# Patient Record
Sex: MALE | Race: WHITE
[De-identification: names, ages, dates, MRNs, and addresses within clinical notes are randomized per-mention and may not be internally consistent; named-entity substitution may affect disease eponyms.]

---

## 2019-02-04 ENCOUNTER — HOSPITAL ENCOUNTER (EMERGENCY)
Dept: HOSPITAL 62 - ER | Age: 24
Discharge: HOME | End: 2019-02-04
Payer: SELF-PAY

## 2019-02-04 VITALS — DIASTOLIC BLOOD PRESSURE: 72 MMHG | SYSTOLIC BLOOD PRESSURE: 134 MMHG

## 2019-02-04 DIAGNOSIS — K08.89: ICD-10-CM

## 2019-02-04 DIAGNOSIS — K02.9: Primary | ICD-10-CM

## 2019-02-04 DIAGNOSIS — F17.200: ICD-10-CM

## 2019-02-04 DIAGNOSIS — K05.10: ICD-10-CM

## 2019-02-04 PROCEDURE — 96372 THER/PROPH/DIAG INJ SC/IM: CPT

## 2019-02-04 PROCEDURE — 99282 EMERGENCY DEPT VISIT SF MDM: CPT

## 2019-02-04 NOTE — ER DOCUMENT REPORT
HPI





- HPI


Time Seen by Provider: 02/04/19 07:45


Pain Level: 2


Notes: 





Patient is a 23-year-old male who presents to the ED complaining of left upper 

dental pain #14  1 week.  He has not noticed any obvious abscess or purulent 

discharge.  Patient states that he is still able to eat and drink, but does have

a decreased p.o. intake due to the pain.  He has tried some over-the-counter 

meds with minimal relief.  No other concerns or complaints.  Denies any 

headache, fever, head injury, neck pain, hoarseness, drooling, URI, sore throat,

chest pain, palpitations, syncope, cough, shortness of breath, wheeze, dyspnea, 

abdominal pain, nausea/vomiting/diarrhea, urinary retention, dysuria, hematuria,

or rash.





- ROS


Systems Reviewed and Negative: Yes All other systems reviewed and negative





Past Medical History





- Social History


Smoking Status: Current Every Day Smoker


Family History: Reviewed & Not Pertinent


Patient has suicidal ideation: No


Patient has homicidal ideation: No


Renal/ Medical History: Denies: Hx Peritoneal Dialysis


Past Surgical History: Reports: Hx Abdominal Surgery





Vertical Provider Document





- CONSTITUTIONAL


Agree With Documented VS: Yes


Notes: 





PHYSICAL EXAMINATION:





GENERAL: Well-appearing, well-nourished and in no acute distress.





HEAD: Atraumatic, normocephalic.





EYES: Pupils equal round and reactive to light, extraocular movements intact, 

sclera anicteric, conjunctiva are normal.





ENT: EAC clear b/l.  TM's intact b/l without erythema, fluid, or perforation.  

Nares patent and without discharge.  oropharynx clear without exudates.  No 

tonsilar hypertrophy or erythema.  Moist mucous membranes.  No sinus tenderness.

 Uvula midline.  No palatine shift.  No tongue protrusion.  No respiratory 

compromise.





Mouth:  Poor dentition.  + severe decay and mild gingivitis.  No obvious abscess

or discharge noted.  No facial swelling.  + tenderness to tooth #14.





NECK: Normal range of motion, supple without lymphadenopathy.  No 

rigidity/meningismus.





LUNGS: Breath sounds clear to auscultation bilaterally and equal.  No wheezes 

rales or rhonchi.





HEART: Regular rate and rhythm without murmurs, rubs, gallops.





NEUROLOGICAL: Cranial nerves grossly intact.  Normal speech, normal gait.  

Normal sensory, motor exams 





PSYCH: Normal mood, normal affect.





SKIN: Warm, Dry, normal turgor, no rashes or lesions noted.





Course





- Re-evaluation


Re-evalutation: 





02/04/19 08:02


Patient is an afebrile, well-hydrated, 23-year-old male who presents to the ED 

with dental pain, suspect nerve root etiology versus infection.  Vitals are 

acceptable.  PE is otherwise unremarkable.  No I&D, labs, or imaging warranted 

at this time based on H&P.  Viscous lidocaine and toradol given today.  I will 

send him home with a prescription for penicillin.  Low suspicion for any 

meningitis, sepsis, peritonsillar/pharyngeal abscess, respiratory compromise, 

Armen's, temporal arteritis, or other emergent systemic condition at this time.

 Patient is aware this condition can change from initial presentation and he 

needs to monitor symptoms closely.  Conservative measures otherwise for 

symptoms.  Call to schedule an appointment with a dentist for further evaluation

and management.  Recheck with your PCM this week as well.  Return to the ED with

any worsening/concerning symptoms otherwise as reviewed in discharge.  Patient 

is in agreement.





- Vital Signs


Vital signs: 


                                        











Temp Pulse Resp BP Pulse Ox


 


 98.1 F   71   18   134/72 H  100 


 


 02/04/19 07:30  02/04/19 07:30  02/04/19 07:30  02/04/19 07:30  02/04/19 07:30














Discharge





- Discharge


Clinical Impression: 


 Pain, dental





Condition: Stable


Disposition: HOME, SELF-CARE


Instructions:  Toothache ([facility]), Penicillin V K ([facility])


Additional Instructions: 


Brush and floss twice daily


Maintain fluid intake


Take antibiotics as directed


Mouthwash, salt water gargles, peroxide rinse as needed


Tylenol/ibuprofen as needed


Recheck with PCM this week


Call today/tomorrow and schedule an appointment with your dentist for further 

evaluation


Return to the ED with any worsening symptoms and/or development of fever, 

headache, facial swelling, swelling of lips/tongue/throat, trouble swallowing, 

drooling, hoarseness, neck pain/stiffness, chest pain, palpitations, syncope, 

shortness of breath, trouble breathing, abdominal pain, n/v/d, 

numbness/tingling, or other worsening symptoms that are concerning to you.


Prescriptions: 


Penicillin V Potassium [Penicillin Vk 250 mg Tablet] 500 mg PO BID #40 tablet


Forms:  Elevated Blood Pressure, Smoking Cessation Education


Referrals: 


[facility] [Provider Group] - Follow up as needed